# Patient Record
Sex: FEMALE | Race: WHITE | NOT HISPANIC OR LATINO | URBAN - METROPOLITAN AREA
[De-identification: names, ages, dates, MRNs, and addresses within clinical notes are randomized per-mention and may not be internally consistent; named-entity substitution may affect disease eponyms.]

---

## 2022-06-27 ENCOUNTER — WEB ENCOUNTER (OUTPATIENT)
Dept: URBAN - METROPOLITAN AREA CLINIC 90 | Facility: CLINIC | Age: 22
End: 2022-06-27

## 2022-06-27 ENCOUNTER — OFFICE VISIT (OUTPATIENT)
Dept: URBAN - METROPOLITAN AREA CLINIC 90 | Facility: CLINIC | Age: 22
End: 2022-06-27
Payer: COMMERCIAL

## 2022-06-27 ENCOUNTER — DASHBOARD ENCOUNTERS (OUTPATIENT)
Age: 22
End: 2022-06-27

## 2022-06-27 VITALS
TEMPERATURE: 97.7 F | HEIGHT: 65 IN | DIASTOLIC BLOOD PRESSURE: 71 MMHG | BODY MASS INDEX: 17.16 KG/M2 | HEART RATE: 81 BPM | SYSTOLIC BLOOD PRESSURE: 111 MMHG | WEIGHT: 103 LBS

## 2022-06-27 DIAGNOSIS — R63.4 WEIGHT LOSS: ICD-10-CM

## 2022-06-27 PROCEDURE — 99214 OFFICE O/P EST MOD 30 MIN: CPT | Performed by: PEDIATRICS

## 2022-06-27 RX ORDER — CYPROHEPTADINE HYDROCHLORIDE 4 MG/1
1 TABLET TABLET ORAL
Qty: 60 | Refills: 3 | OUTPATIENT
Start: 2022-06-27

## 2022-06-27 NOTE — HPI-TODAY'S VISIT:
Kiarra presents for f/u of weight loss. History is provided by the patient and her mother.     Previously seen by my partner Dr. Henry in 2019 for the same concern. CMP, CBC, ESR, CRP, celiac serologies and H pylori testing were negative.  Seen by PCP recently and 6 lb weight loss is noted.  She has been under 110 lbs for the past 2 years.   She eats 3 meals/day but eats small amounts.  Eats a good variety of foods, no diet restrictions ar noted. Notes abdominal pain with fried foods.    Notes hiccups with eating breads.   Endorses early satiety.  She denies recent abdominal pain, nausea or vomiting.  Has occasional heartburn, no dysphagia.  Mild flatulence is noted, no belching or bloating.  Stooling twice a week, bristol type 3, no blood.   Mother has no concerns about eating disorder and patient endorses wanting to gain weight.   No fatigue, mouth sores, joint pain or swelling.   She notes that her feet are frequently cold and purple. Denies any significant exercise.

## 2022-06-30 LAB
A/G RATIO: 2
ABSOLUTE BASOPHILS: 27
ABSOLUTE EOSINOPHILS: 160
ABSOLUTE LYMPHOCYTES: 2047
ABSOLUTE MONOCYTES: 596
ABSOLUTE NEUTROPHILS: 6070
ALBUMIN: 4.7
ALKALINE PHOSPHATASE: 19
ALT (SGPT): 13
AST (SGOT): 14
BASOPHILS: 0.3
BILIRUBIN, TOTAL: 0.8
BUN/CREATININE RATIO: (no result)
BUN: 13
CALCIUM: 9.8
CARBON DIOXIDE, TOTAL: 26
CHLORIDE: 107
CREATININE: 0.75
EGFR AFRICAN AMERICAN: 133
EGFR NON-AFR. AMERICAN: 115
EOSINOPHILS: 1.8
GLOBULIN, TOTAL: 2.3
GLUCOSE: 80
HEMATOCRIT: 38.1
HEMOGLOBIN: 12.7
IMMUNOGLOBULIN A, QN, SERUM: 102
INTERPRETATION: (no result)
LYMPHOCYTES: 23
MCH: 28.8
MCHC: 33.3
MCV: 86.4
MONOCYTES: 6.7
MPV: 10.1
NEUTROPHILS: 68.2
PLATELET COUNT: 244
POTASSIUM: 4.6
PROTEIN, TOTAL: 7
RDW: 13.3
RED BLOOD CELL COUNT: 4.41
SODIUM: 143
T-TRANSGLUTAMINASE (TTG) IGA: <1
TSH W/REFLEX TO FT4: 2.13
WHITE BLOOD CELL COUNT: 8.9

## 2022-07-07 LAB
CALPROTECTIN, FECAL: 16
PANCREATIC ELASTASE, FECAL: 304

## 2022-08-05 ENCOUNTER — OFFICE VISIT (OUTPATIENT)
Dept: URBAN - METROPOLITAN AREA CLINIC 90 | Facility: CLINIC | Age: 22
End: 2022-08-05

## 2022-08-08 ENCOUNTER — OFFICE VISIT (OUTPATIENT)
Dept: URBAN - METROPOLITAN AREA CLINIC 90 | Facility: CLINIC | Age: 22
End: 2022-08-08